# Patient Record
Sex: FEMALE | Race: WHITE | URBAN - METROPOLITAN AREA
[De-identification: names, ages, dates, MRNs, and addresses within clinical notes are randomized per-mention and may not be internally consistent; named-entity substitution may affect disease eponyms.]

---

## 2022-04-19 ENCOUNTER — HOSPITAL ENCOUNTER (OUTPATIENT)
Age: 67
Setting detail: SPECIMEN
Discharge: HOME | End: 2022-04-19

## 2022-04-19 ENCOUNTER — NURSE ONLY (OUTPATIENT)
Dept: FAMILY MEDICINE CLINIC | Age: 67
End: 2022-04-19

## 2022-04-20 LAB
SARS-COV-2: NORMAL
SARS-COV-2: NOT DETECTED
SOURCE: NORMAL

## 2022-12-14 ENCOUNTER — HOSPITAL ENCOUNTER (OUTPATIENT)
Dept: GENERAL RADIOLOGY | Age: 67
Discharge: HOME | End: 2022-12-16
Payer: MEDICARE

## 2022-12-14 ENCOUNTER — HOSPITAL ENCOUNTER (OUTPATIENT)
Age: 67
Discharge: HOME | End: 2022-12-16
Payer: MEDICARE

## 2022-12-14 DIAGNOSIS — M79.651 RIGHT THIGH PAIN: ICD-10-CM

## 2022-12-14 PROCEDURE — 73502 X-RAY EXAM HIP UNI 2-3 VIEWS: CPT

## 2023-01-09 ENCOUNTER — TELEPHONE (OUTPATIENT)
Dept: ORTHOPEDIC SURGERY | Age: 68
End: 2023-01-09

## 2023-01-10 NOTE — TELEPHONE ENCOUNTER
Called pt and she stated that she had a femur fx when she was 6 and bone was 'put together the wrong way'. She is now having right hip pain and could be stemming from this issue. She is not having a lot of pain but was advised by PCP to be evaluated based off xrs that were done on 12/15/22.  Scheduled to see Abigail Darlin on 1/12/23

## 2023-01-12 ENCOUNTER — OFFICE VISIT (OUTPATIENT)
Dept: ORTHOPEDIC SURGERY | Age: 68
End: 2023-01-12
Payer: MEDICARE

## 2023-01-12 VITALS — HEIGHT: 63 IN | BODY MASS INDEX: 21.97 KG/M2 | WEIGHT: 124 LBS | RESPIRATION RATE: 14 BRPM

## 2023-01-12 DIAGNOSIS — M21.851: ICD-10-CM

## 2023-01-12 DIAGNOSIS — S72.351S CLOSED DISPLACED COMMINUTED FRACTURE OF SHAFT OF RIGHT FEMUR, SEQUELA: Primary | ICD-10-CM

## 2023-01-12 PROCEDURE — G8427 DOCREV CUR MEDS BY ELIG CLIN: HCPCS | Performed by: PHYSICIAN ASSISTANT

## 2023-01-12 PROCEDURE — 4004F PT TOBACCO SCREEN RCVD TLK: CPT | Performed by: PHYSICIAN ASSISTANT

## 2023-01-12 PROCEDURE — G8484 FLU IMMUNIZE NO ADMIN: HCPCS | Performed by: PHYSICIAN ASSISTANT

## 2023-01-12 PROCEDURE — 99204 OFFICE O/P NEW MOD 45 MIN: CPT | Performed by: PHYSICIAN ASSISTANT

## 2023-01-12 PROCEDURE — 1123F ACP DISCUSS/DSCN MKR DOCD: CPT | Performed by: PHYSICIAN ASSISTANT

## 2023-01-12 PROCEDURE — G8400 PT W/DXA NO RESULTS DOC: HCPCS | Performed by: PHYSICIAN ASSISTANT

## 2023-01-12 PROCEDURE — 3017F COLORECTAL CA SCREEN DOC REV: CPT | Performed by: PHYSICIAN ASSISTANT

## 2023-01-12 PROCEDURE — 1090F PRES/ABSN URINE INCON ASSESS: CPT | Performed by: PHYSICIAN ASSISTANT

## 2023-01-12 PROCEDURE — G8420 CALC BMI NORM PARAMETERS: HCPCS | Performed by: PHYSICIAN ASSISTANT

## 2023-01-12 NOTE — PATIENT INSTRUCTIONS
Patient Education        Trochanteric Bursitis: Exercises  Introduction  Here are some examples of exercises for you to try. The exercises may be suggested for a condition or for rehabilitation. Start each exercise slowly. Ease off the exercises if you start to have pain.  You will be told when to start these exercises and which ones will work best for you.  How to do the exercises  Hamstring wall stretch  Lie on your back in a doorway, with your good leg through the open door.  Slide your affected leg up the wall to straighten your knee. You should feel a gentle stretch down the back of your leg.  Hold the stretch for at least 1 minute to begin. Then try to lengthen the time you hold the stretch to as long as 6 minutes.  Repeat 2 to 4 times.  If you do not have a place to do this exercise in a doorway, there is another way to do it:  Lie on your back, and bend the knee of your affected leg.  Loop a towel under the ball and toes of that foot, and hold the ends of the towel in your hands.  Straighten your knee, and slowly pull back on the towel. You should feel a gentle stretch down the back of your leg.  Hold the stretch for 15 to 30 seconds. Or even better, hold the stretch for 1 minute if you can.  Repeat 2 to 4 times.  Do not arch your back.  Do not bend either knee.  Keep one heel touching the floor and the other heel touching the wall. Do not point your toes.  Straight-leg raises to the outside  Lie on your side, with your affected leg on top.  Tighten the front thigh muscles of your top leg to keep your knee straight.  Keep your hip and your leg straight in line with the rest of your body, and keep your knee pointing forward. Do not drop your hip back.  Lift your top leg straight up toward the ceiling, about 12 inches off the floor. Hold for about 6 seconds, then slowly lower your leg.  Repeat 8 to 12 times.  Clamshell  Lie on your side, with your affected leg on top and your head propped on a pillow. Keep  your feet and knees together and your knees bent. Raise your top knee, but keep your feet together. Do not let your hips roll back. Your legs should open up like a clamshell. Hold for 6 seconds. Slowly lower your knee back down. Rest for 10 seconds. Repeat 8 to 12 times. Standing quadriceps stretch  If you are not steady on your feet, hold on to a chair, counter, or wall. You can also lie on your stomach or your side to do this exercise. Bend the knee of the leg you want to stretch, and reach behind you to grab the front of your foot or ankle with the hand on the same side. For example, if you are stretching your right leg, use your right hand. Keeping your knees next to each other, pull your foot toward your buttock until you feel a gentle stretch across the front of your hip and down the front of your thigh. Your knee should be pointed directly to the ground, and not out to the side. Hold the stretch for 15 to 30 seconds. Repeat 2 to 4 times. Piriformis stretch  Lie on your back with your legs straight. Lift your affected leg and bend your knee. With your opposite hand, reach across your body, and then gently pull your knee toward your opposite shoulder. Hold the stretch for 15 to 30 seconds. Repeat 2 to 4 times. Double knee-to-chest  Lie on your back with your knees bent and your feet flat on the floor. You can put a small pillow under your head and neck if it is more comfortable. Bring both knees to your chest.  Keep your lower back pressed to the floor. Hold for 15 to 30 seconds. Relax, and lower your knees to the starting position. Repeat 2 to 4 times. Follow-up care is a key part of your treatment and safety. Be sure to make and go to all appointments, and call your doctor if you are having problems. It's also a good idea to know your test results and keep a list of the medicines you take.   Current as of: March 9, 2022               Content Version: 13.5  © 7520-7418 [employer]. Care instructions adapted under license by [facility] ([facility]). If you have questions about a medical condition or this instruction, always ask your healthcare professional. [address] any warranty or liability for your use of this information.

## 2023-01-12 NOTE — PROGRESS NOTES
[address] [facility], [address] [place] [address] Road [address] [place] [address], [address] [place] [address], [address] [facility]           Dept Phone: [phone]           Dept Fax:  [phone] [address] [facility]           Lyric Vaughn          Dept Phone: [phone]           Dept Fax:  [phone]      Chief Compliant:  Chief Complaint   Patient presents with    Hip Pain     Right hip, NP        History of Present Illness: This is a 69 y.o. female who presents to the clinic today for evaluation of had concerns including Hip Pain (Right hip, NP). Ms. Ivis Mata is a 66-year-old female who presents for evaluation of intermittent right hip and right leg pain. Patient with history of fairly severe femoral shaft fracture which occurred 61 years ago. She reports that the femur was \"broken into pieces\". She states this was treated nonoperatively with extensive casting at that time. She states she has had a leg length discrepancy with the right being shorter than left since becoming an adult. She states she has modified activity accordingly but has not had any significant pain until the past several months when she has started to have intermittent pain of the right lateral hip which occasionally radiates down the leg. She denies any back pain numbness or tingling     Patient was evaluated by PCP who ordered x-rays Of the hip in December which demonstrated an angular deformity likely indicative of remote trauma without evidence of acute fracture or significant degenerative changes. Past History:  No current outpatient medications on file.   No Known Allergies  Social History     Socioeconomic History    Marital status:      Spouse name: Not on file    Number of children: Not on file    Years of education: Not on file    Highest education level: Not on file   Occupational History    Not on file   Tobacco Use Smoking status: Not on file    Smokeless tobacco: Not on file   Substance and Sexual Activity    Alcohol use: Not on file    Drug use: Not on file    Sexual activity: Not on file   Other Topics Concern    Not on file   Social History Narrative    Not on file     Social Determinants of Health     Financial Resource Strain: Not on file   Food Insecurity: Not on file   Transportation Needs: Not on file   Physical Activity: Not on file   Stress: Not on file   Social Connections: Not on file   Intimate Partner Violence: Not on file   Housing Stability: Not on file     No past medical history on file. No past surgical history on file. No family history on file. Review of Systems   Constitutional: Negative for fever, chills, sweats. Eyes: Negative for changes in vision, or pain. HENT: Negative for ear ache, epistaxis, or sore throat. Respiratory/Cardio: Negative for Chest pain, palpitations, SOB, or cough. Gastrointestinal: Negative for abdominal pain, N/V/D. Genitourinary: Negative for dysuria, frequency, urgency, or hematuria. Neurological: Negative for headache, numbness, or weakness. Integumentary: Negative for rash, itching, laceration, or abrasion. Musculoskeletal: Positive for Hip Pain (Right hip, NP)       Physical Exam:  Constitutional: Patient is oriented to person, place, and time. Patient appears well-developed and well nourished. HENT: Negative otherwise noted  Head: Normocephalic and Atraumatic  Nose: Normal  Eyes: Conjunctivae and EOM are normal  Neck: Normal range of motion Neck supple. Respiratory/Cardio: Effort normal. No respiratory distress. Musculoskeletal:    right Hip    Tenderness: Severe tenderness over the right greater trochanter and mid lateral thigh/IT band. Mild tenderness over the proximal IT band.   No tenderness to the anterior posterior hip       Range of Motion:      Extension: 10     Flexion: 120     Internal Rotation: 30     External Rotation: 40     Abduction: 40     Adduction:  30       Muscle Strength      Abduction: 5/5     Adduction: 5/5     Flexion: 5/5         Gait: Antalgic   Robert Test: Negative   Brannon Test: Positive       Neurological: Patient is alert and oriented to person, place, and time. Normal strenght. No sensory deficit. Skin: Skin is warm and dry  Psychiatric: Behavior is normal. Thought content normal.  Nursing note and vitals reviewed. Labs and Imaging:     XR HIP RIGHT (2-3 VIEWS)  Narrative: EXAMINATION:  3 XRAY VIEWS OF THE RIGHT HIP    12/14/2022 4:03 pm    COMPARISON:  None. HISTORY:  ORDERING SYSTEM PROVIDED HISTORY: Right thigh pain  TECHNOLOGIST PROVIDED HISTORY:  Reason for Exam: Right thigh pain  Additional signs and symptoms: Pt c/o right anterior mid thigh pain on and  off for several years that has become worse and more constant lately. Relevant Medical/Surgical History: Pt states she broke her femur 60 years ago  and it did not heal properly. FINDINGS:  Angulated deformity of the proximal femoral diaphysis may be congenital  possibly from old trauma. Otherwise, no fracture or dislocation. No  destructive bony abnormality identified. Impression: Angulated deformity proximal femoral diaphysis possibly congenital or from  old trauma    No acute abnormality identified    X-rays taken in clinic today and preliminarily reviewed by me 1/12/23:  4 views of the right femur demonstrate mild degenerative changes of the right knee especially in the medial compartment. Hip joint overall is well-maintained without significant degenerative changes. There is an angulated deformity of the proximal femoral diaphysis consistent with remote trauma reported by patient. No evidence of acute fracture       Orders Placed This Encounter   Procedures    XR FEMUR RIGHT (MIN 2 VIEWS)     Standing Status:   Future     Number of Occurrences:   1     Standing Expiration Date:   1/12/2024       Assessment and Plan:  1.  Closed displaced comminuted fracture of shaft of right femur, sequela          PLAN:  Dom Mcdonald is a 69 y.o. old female who presents for evaluation of intermittent right hip pain. Patient reports significant trauma to the right leg which resulted in a displaced proximal femoral shaft fracture. This was treated nonoperatively but unfortunate patient has been dealing with a leg length discrepancy since adulthood. She states she has tolerated this very well and has had minimal issues with her legs but has started to have recent right hip and right leg pain. X-rays today demonstrate no evidence of acute fracture but do demonstrate a angulated deformity likely from remote injury. Examination of patient's hip demonstrates excellent range of motion she does have some pain consistent with trochanteric bursitis. We discussed treatment options available to her including home exercise program, referral to physical therapy and a corticosteroid injection. At this time patient reports pain is not bad enough for further intervention and would like to try a home exercise program before pursuing further intervention. We will see patient back on a as needed basis      Electronically signed by ALBANIA Chand on 1/12/23 at 2:45 PM EST        Please note that this chart was generated using voice recognition Dragon dictation software. Although every effort was made to ensure the accuracy of this automated transcription, some errors in transcription may have occurred.

## 2023-02-01 ENCOUNTER — APPOINTMENT (OUTPATIENT)
Dept: CT IMAGING | Age: 68
End: 2023-02-01
Payer: MEDICARE

## 2023-02-01 ENCOUNTER — APPOINTMENT (OUTPATIENT)
Dept: GENERAL RADIOLOGY | Age: 68
End: 2023-02-01
Payer: MEDICARE

## 2023-02-01 ENCOUNTER — HOSPITAL ENCOUNTER (EMERGENCY)
Age: 68
Discharge: HOME | End: 2023-02-01
Attending: EMERGENCY MEDICINE
Payer: MEDICARE

## 2023-02-01 VITALS
RESPIRATION RATE: 17 BRPM | DIASTOLIC BLOOD PRESSURE: 65 MMHG | WEIGHT: 123 LBS | TEMPERATURE: 98 F | BODY MASS INDEX: 22.63 KG/M2 | HEART RATE: 82 BPM | OXYGEN SATURATION: 96 % | HEIGHT: 62 IN | SYSTOLIC BLOOD PRESSURE: 144 MMHG

## 2023-02-01 DIAGNOSIS — R68.89 NECK COMPLAINT: Primary | ICD-10-CM

## 2023-02-01 LAB
ABSOLUTE EOS #: 0.1 K/UL (ref 0–0.4)
ABSOLUTE LYMPH #: 2 K/UL (ref 1–4.8)
ABSOLUTE MONO #: 0.6 K/UL (ref 0.1–1.3)
ALBUMIN SERPL-MCNC: 4.8 G/DL (ref 3.5–5.2)
ALP SERPL-CCNC: 45 U/L (ref 35–104)
ALT SERPL-CCNC: 13 U/L (ref 5–33)
ANION GAP SERPL CALCULATED.3IONS-SCNC: 13 MMOL/L (ref 9–17)
AST SERPL-CCNC: 18 U/L
BASOPHILS # BLD: 1 % (ref 0–2)
BASOPHILS ABSOLUTE: 0.1 K/UL (ref 0–0.2)
BILIRUB SERPL-MCNC: 0.3 MG/DL (ref 0.3–1.2)
BUN SERPL-MCNC: 11 MG/DL (ref 8–23)
CALCIUM SERPL-MCNC: 9.7 MG/DL (ref 8.6–10.4)
CHLORIDE SERPL-SCNC: 101 MMOL/L (ref 98–107)
CO2 SERPL-SCNC: 26 MMOL/L (ref 20–31)
CREAT SERPL-MCNC: 0.48 MG/DL (ref 0.5–0.9)
EOSINOPHILS RELATIVE PERCENT: 2 % (ref 0–4)
GFR SERPL CREATININE-BSD FRML MDRD: >60 ML/MIN/1.73M2
GLUCOSE SERPL-MCNC: 102 MG/DL (ref 70–99)
HCT VFR BLD AUTO: 39.8 % (ref 36–46)
HGB BLD-MCNC: 12.9 G/DL (ref 12–16)
INR PPP: 0.9
LYMPHOCYTES # BLD: 34 % (ref 24–44)
MAGNESIUM SERPL-MCNC: 2.2 MG/DL (ref 1.6–2.6)
MCH RBC QN AUTO: 30 PG (ref 26–34)
MCHC RBC AUTO-ENTMCNC: 32.6 G/DL (ref 31–37)
MCV RBC AUTO: 92 FL (ref 80–100)
MONOCYTES # BLD: 11 % (ref 1–7)
PDW BLD-RTO: 14.6 % (ref 11.5–14.9)
PLATELET # BLD AUTO: 249 K/UL (ref 150–450)
PMV BLD AUTO: 7.9 FL (ref 6–12)
POTASSIUM SERPL-SCNC: 3.6 MMOL/L (ref 3.7–5.3)
PROT SERPL-MCNC: 7.9 G/DL (ref 6.4–8.3)
PROTHROMBIN TIME: 12.3 SEC (ref 11.8–14.6)
RBC # BLD: 4.32 M/UL (ref 4–5.2)
SEG NEUTROPHILS: 52 % (ref 36–66)
SEGMENTED NEUTROPHILS ABSOLUTE COUNT: 3 K/UL (ref 1.3–9.1)
SODIUM SERPL-SCNC: 140 MMOL/L (ref 135–144)
T4 FREE SERPL-MCNC: 1.29 NG/DL (ref 0.93–1.7)
TROPONIN I SERPL DL<=0.01 NG/ML-MCNC: 7 NG/L (ref 0–14)
TSH SERPL-ACNC: 1.03 UIU/ML (ref 0.3–5)
WBC # BLD AUTO: 5.7 K/UL (ref 3.5–11)

## 2023-02-01 PROCEDURE — 2580000003 HC RX 258: Performed by: EMERGENCY MEDICINE

## 2023-02-01 PROCEDURE — 36415 COLL VENOUS BLD VENIPUNCTURE: CPT

## 2023-02-01 PROCEDURE — 6360000004 HC RX CONTRAST MEDICATION: Performed by: EMERGENCY MEDICINE

## 2023-02-01 PROCEDURE — 71045 X-RAY EXAM CHEST 1 VIEW: CPT

## 2023-02-01 PROCEDURE — 93005 ELECTROCARDIOGRAM TRACING: CPT | Performed by: EMERGENCY MEDICINE

## 2023-02-01 PROCEDURE — 80053 COMPREHEN METABOLIC PANEL: CPT

## 2023-02-01 PROCEDURE — 99285 EMERGENCY DEPT VISIT HI MDM: CPT

## 2023-02-01 PROCEDURE — 85610 PROTHROMBIN TIME: CPT

## 2023-02-01 PROCEDURE — 85025 COMPLETE CBC W/AUTO DIFF WBC: CPT

## 2023-02-01 PROCEDURE — 83735 ASSAY OF MAGNESIUM: CPT

## 2023-02-01 PROCEDURE — 84443 ASSAY THYROID STIM HORMONE: CPT

## 2023-02-01 PROCEDURE — 70491 CT SOFT TISSUE NECK W/DYE: CPT

## 2023-02-01 PROCEDURE — 84439 ASSAY OF FREE THYROXINE: CPT

## 2023-02-01 PROCEDURE — 84484 ASSAY OF TROPONIN QUANT: CPT

## 2023-02-01 RX ORDER — 0.9 % SODIUM CHLORIDE 0.9 %
100 INTRAVENOUS SOLUTION INTRAVENOUS ONCE
Status: COMPLETED | OUTPATIENT
Start: 2023-02-01 | End: 2023-02-01

## 2023-02-01 RX ORDER — SODIUM CHLORIDE 0.9 % (FLUSH) 0.9 %
10 SYRINGE (ML) INJECTION PRN
Status: DISCONTINUED | OUTPATIENT
Start: 2023-02-01 | End: 2023-02-01 | Stop reason: HOSPADM

## 2023-02-01 RX ORDER — TRAZODONE HYDROCHLORIDE 100 MG/1
100 TABLET ORAL NIGHTLY
COMMUNITY

## 2023-02-01 RX ADMIN — IOPAMIDOL 75 ML: 755 INJECTION, SOLUTION INTRAVENOUS at 16:17

## 2023-02-01 RX ADMIN — SODIUM CHLORIDE 100 ML: 9 INJECTION, SOLUTION INTRAVENOUS at 16:18

## 2023-02-01 RX ADMIN — SODIUM CHLORIDE, PRESERVATIVE FREE 10 ML: 5 INJECTION INTRAVENOUS at 16:18

## 2023-02-01 ASSESSMENT — ENCOUNTER SYMPTOMS
EYE PAIN: 0
BACK PAIN: 0
COLOR CHANGE: 0
ABDOMINAL PAIN: 0
SHORTNESS OF BREATH: 1

## 2023-02-01 NOTE — ED PROVIDER NOTES
EMERGENCY DEPARTMENT ENCOUNTER    Pt Name: Silver Spicer  MRN: [id number]  [place] 1955  Date of evaluation: 2/1/23  CHIEF COMPLAINT       Chief Complaint   Patient presents with    Lymphadenopathy    Shortness of Breath     HISTORY OF PRESENT ILLNESS   58-year-old female presents for reports of right-sided neck swelling. She states that she had a veneer break off approximately 6 to 7 months ago she states that she had followed up with her dentist multiple times over the summer and ultimately recently had the tooth pulled. She reports that she still has concerned that there is an infection going on in the tooth. She reports that she has been on multiple courses of antibiotic and she does not think that it is getting better. She states that she currently not having any pain but she reports that she feels like she is having a difficult time breathing she states that her neck feels \"tight and she feels like it is swollen. She denies any difficulty swallowing denies any associated fevers or chills. She denies any chest pain abdominal pain nausea or vomiting. Denies any significant past medical history. The history is provided by the patient. REVIEW OF SYSTEMS     Review of Systems   Constitutional:  Negative for fever. HENT:  Negative for congestion and ear pain. Eyes:  Negative for pain. Respiratory:  Positive for shortness of breath. Cardiovascular:  Negative for chest pain, palpitations and leg swelling. Gastrointestinal:  Negative for abdominal pain. Genitourinary:  Negative for dysuria and flank pain. Musculoskeletal:  Negative for back pain. Skin:  Negative for color change. Neurological:  Negative for numbness and headaches. Psychiatric/Behavioral:  Negative for confusion. All other systems reviewed and are negative. PASTMEDICAL HISTORY   History reviewed. No pertinent past medical history.   Past Problem List  There is no problem list on file for this patient.    SURGICAL HISTORY     History reviewed. No pertinent surgical history.  CURRENT MEDICATIONS       Discharge Medication List as of 2/1/2023  5:13 PM        CONTINUE these medications which have NOT CHANGED    Details   traZODone (DESYREL) 100 MG tablet Take 100 mg by mouth nightlyHistorical Med           ALLERGIES     has No Known Allergies.  FAMILY HISTORY     has no family status information on file.      SOCIAL HISTORY       Social History     Substance Use Topics    Alcohol use: Yes     Comment: social     PHYSICAL EXAM     INITIAL VITALS: BP (!) 144/65   Pulse 82   Temp 98 °F (36.7 °C) (Oral)   Resp 17   Ht 5' 2\" (1.575 m)   Wt 123 lb (55.8 kg)   SpO2 96%   BMI 22.50 kg/m²    Physical Exam  Vitals and nursing note reviewed.   Constitutional:       General: She is not in acute distress.     Appearance: Normal appearance. She is not ill-appearing or toxic-appearing.   HENT:      Head: Normocephalic and atraumatic.      Jaw: There is normal jaw occlusion. No trismus, tenderness, swelling, pain on movement or malocclusion.      Nose: Nose normal.      Mouth/Throat:      Mouth: Mucous membranes are moist.      Dentition: No dental tenderness, gingival swelling, dental caries, dental abscesses or gum lesions.      Pharynx: Oropharynx is clear. Uvula midline. No pharyngeal swelling or posterior oropharyngeal erythema.      Tonsils: No tonsillar exudate or tonsillar abscesses.        Comments: Floor the mouth is soft, no tongue elevation, no significant swelling or erythema noted in the neck or chin  Eyes:      Extraocular Movements: Extraocular movements intact.      Conjunctiva/sclera: Conjunctivae normal.      Pupils: Pupils are equal, round, and reactive to light.   Cardiovascular:      Rate and Rhythm: Normal rate and regular rhythm.      Pulses: Normal pulses.      Heart sounds: Normal heart sounds.   Pulmonary:      Effort: Pulmonary effort is normal.      Breath sounds: Normal breath sounds. No  stridor. Abdominal:      General: Bowel sounds are normal. There is no distension. Palpations: Abdomen is soft. Tenderness: There is no abdominal tenderness. Musculoskeletal:         General: Normal range of motion. Cervical back: Normal range of motion. No spinous process tenderness or muscular tenderness. Lymphadenopathy:      Head:      Right side of head: No submandibular adenopathy. Left side of head: No submandibular adenopathy. Cervical: No cervical adenopathy. Skin:     General: Skin is warm and dry. Capillary Refill: Capillary refill takes less than 2 seconds. Neurological:      General: No focal deficit present. Mental Status: She is alert and oriented to person, place, and time. Cranial Nerves: Cranial nerves 2-12 are intact. Sensory: Sensation is intact. Motor: Motor function is intact. Psychiatric:         Mood and Affect: Mood normal.         Thought Content: Thought content does not include homicidal or suicidal ideation. MEDICAL DECISION MAKING / ED COURSE:   68-year-old female presents for concern for dental infection.   On initial exam patient in no acute distress vitals are stable she has a old site in the right mandible area where tooth was extracted there is no obvious signs of infection at this time, no abscess seen, no evidence of Ludwigs angina      1)  Number and Complexity of Problems Addressed at this Encounter  Problem List This Visit: Concern for dental infection    Differential Diagnosis: Apical abscess, lymphadenopathy, malignancy, deep space abscess    Diagnoses Considered but Do Not Suspect:  Ludwigs angina          3)  Treatment and Disposition         We will check basic labs and imaging to evaluate for any signs of lymphadenopathy or abscess    Labs were reviewed and unremarkable, chest x-ray was negative for acute process, CT was reviewed showing no acute abnormality of the soft tissue structures and neck no signs of masses or abnormal collections no significantly enlarged lymph nodes, no evidence of abscess or infection around the teeth    Given that patient has no evidence of abscess or lymphadenopathy on CT feel that she is stable for discharge home at this time she is in secretions, airway is intact, will provide information for ENT follow-up as well also suggested that patient follow-up again with her dentist, discussed return precautions    Patient repeat assessment: Today new complaints    Disposition discussion with patient/family, Shared Decision Making: Results were discussed with patient and  discussed need for follow-up with dentist, recommend follow-up with ENT as well and discussed return precautions, patient voiced understanding and is comfortable with plan and discharge    Patient/Guardian was informed of their diagnosis and told to follow up with PCP & dentist/ENT in 1-3 days. Patient demonstrates understanding and agreement with the plan. They were given the opportunity to ask questions and those questions were answered to the best of our ability with the available information. Patient/Guardian told to return to the ED for any new, worsening, changing or persistent symptoms. CRITICAL CARE:       PROCEDURES:    Procedures      DATA FOR LAB AND RADIOLOGY TESTS ORDERED BELOW ARE REVIEWED BY THE ED CLINICIAN:    RADIOLOGY: All x-rays, CT, MRI, and formal ultrasound images (except ED bedside ultrasound) are read by the radiologist, see reports below, unless otherwise noted in MDM or here. Reports below are reviewed by myself. CT SOFT TISSUE NECK W CONTRAST   Final Result   No acute abnormality of the soft tissue structures of the neck. No mass   lesion or abnormal collection. No pathologically enlarged neck   lymphadenopathy. No discrete inflammatory changes noted surrounding the mandibular or   maxillary teeth. No evidence of cavitary change or periapical abscess of the   teeth is noted. There are  right maxillary premolar and left mandibular   molar dental implants. XR CHEST PORTABLE   Final Result   No acute findings. Pulmonary hyperinflation and interstitial prominence. Correlate clinically   for COPD. LABS: Lab orders shown below, the results are reviewed by myself, and all abnormals are listed below. Labs Reviewed   CBC WITH AUTO DIFFERENTIAL - Abnormal; Notable for the following components:       Result Value    Monocytes 11 (*)     All other components within normal limits   COMPREHENSIVE METABOLIC PANEL - Abnormal; Notable for the following components:    Glucose 102 (*)     Creatinine 0.48 (*)     Potassium 3.6 (*)     All other components within normal limits   MAGNESIUM   T4, FREE   PROTIME-INR   TROPONIN   TSH       Vitals Reviewed:    Vitals:    02/01/23 1429 02/01/23 1430   BP:  (!) 144/65   Pulse: 82    Resp:  17   Temp:  98 °F (36.7 °C)   TempSrc:  Oral   SpO2: 96%    Weight: 123 lb (55.8 kg)    Height: 5' 2\" (1.575 m)      MEDICATIONS GIVEN TO PATIENT THIS ENCOUNTER:  Orders Placed This Encounter   Medications    iopamidol (ISOVUE-370) 76 % injection 75 mL    0.9 % sodium chloride bolus    DISCONTD: sodium chloride flush 0.9 % injection 10 mL     DISCHARGE PRESCRIPTIONS:  Discharge Medication List as of 2/1/2023  5:13 PM        PHYSICIAN CONSULTS ORDERED THIS ENCOUNTER:  None  FINAL IMPRESSION      1.  Neck complaint          DISPOSITION/PLAN   DISPOSITION Decision To Discharge 02/01/2023 05:09:23 PM      OUTPATIENT FOLLOW UP THE PATIENT:  Naida Cota  [address]  [phone]    Schedule an appointment as soon as possible for a visit       [facility] ED  [address]  [address] [phone]    As needed, If symptoms worsen    Your Dentist    Schedule an appointment as soon as possible for a visit       Anabel Odonnell MD  [address] [place] [address]  [phone]    Schedule an appointment as soon as possible for a visit       Raliegh Brunner, DO Raliegh Brunner, DO  02/02/23 1 Chase Dowling, DO  02/02/23 1046

## 2023-02-02 LAB
EKG ATRIAL RATE: 68 BPM
EKG P AXIS: 16 DEGREES
EKG P-R INTERVAL: 104 MS
EKG Q-T INTERVAL: 420 MS
EKG QRS DURATION: 102 MS
EKG QTC CALCULATION (BAZETT): 446 MS
EKG R AXIS: 47 DEGREES
EKG T AXIS: 40 DEGREES
EKG VENTRICULAR RATE: 68 BPM

## 2023-02-02 PROCEDURE — 93010 ELECTROCARDIOGRAM REPORT: CPT | Performed by: INTERNAL MEDICINE

## 2023-09-14 ENCOUNTER — HOSPITAL ENCOUNTER (OUTPATIENT)
Age: 68
Discharge: HOME | End: 2023-09-14
Payer: MEDICARE

## 2023-09-14 PROCEDURE — 84520 ASSAY OF UREA NITROGEN: CPT

## 2023-09-14 PROCEDURE — 85025 COMPLETE CBC W/AUTO DIFF WBC: CPT

## 2023-09-14 PROCEDURE — 80076 HEPATIC FUNCTION PANEL: CPT

## 2023-09-14 PROCEDURE — 82565 ASSAY OF CREATININE: CPT

## 2023-09-14 PROCEDURE — 86038 ANTINUCLEAR ANTIBODIES: CPT

## 2023-09-14 PROCEDURE — 86235 NUCLEAR ANTIGEN ANTIBODY: CPT

## 2023-09-14 PROCEDURE — 86225 DNA ANTIBODY NATIVE: CPT

## 2023-09-14 PROCEDURE — 36415 COLL VENOUS BLD VENIPUNCTURE: CPT

## 2023-09-14 PROCEDURE — 86160 COMPLEMENT ANTIGEN: CPT

## 2023-09-15 LAB
ALBUMIN SERPL-MCNC: 4.7 G/DL (ref 3.5–5.2)
ALBUMIN/GLOB SERPL: 1.7 {RATIO} (ref 1–2.5)
ALP SERPL-CCNC: 53 U/L (ref 35–104)
ALT SERPL-CCNC: 17 U/L (ref 5–33)
AST SERPL-CCNC: 24 U/L
BASOPHILS # BLD: 0.04 K/UL (ref 0–0.2)
BASOPHILS NFR BLD: 1 % (ref 0–2)
BILIRUB DIRECT SERPL-MCNC: 0.1 MG/DL
BILIRUB INDIRECT SERPL-MCNC: 0.1 MG/DL (ref 0–1)
BILIRUB SERPL-MCNC: 0.2 MG/DL (ref 0.3–1.2)
BUN SERPL-MCNC: 5 MG/DL (ref 8–23)
CREAT SERPL-MCNC: 0.5 MG/DL (ref 0.5–0.9)
EOSINOPHIL # BLD: 0.1 K/UL (ref 0–0.44)
EOSINOPHILS RELATIVE PERCENT: 2 % (ref 1–4)
ERYTHROCYTE [DISTWIDTH] IN BLOOD BY AUTOMATED COUNT: 14.3 % (ref 11.8–14.4)
GFR SERPL CREATININE-BSD FRML MDRD: >60 ML/MIN/1.73M2
HCT VFR BLD AUTO: 41.8 % (ref 36.3–47.1)
HGB BLD-MCNC: 13.3 G/DL (ref 11.9–15.1)
IMM GRANULOCYTES # BLD AUTO: <0.03 K/UL (ref 0–0.3)
IMM GRANULOCYTES NFR BLD: 0 %
LYMPHOCYTES NFR BLD: 1.87 K/UL (ref 1.1–3.7)
LYMPHOCYTES RELATIVE PERCENT: 39 % (ref 24–43)
MCH RBC QN AUTO: 30.2 PG (ref 25.2–33.5)
MCHC RBC AUTO-ENTMCNC: 31.8 G/DL (ref 28.4–34.8)
MCV RBC AUTO: 95 FL (ref 82.6–102.9)
MONOCYTES NFR BLD: 0.6 K/UL (ref 0.1–1.2)
MONOCYTES NFR BLD: 13 % (ref 3–12)
NEUTROPHILS NFR BLD: 45 % (ref 36–65)
NEUTS SEG NFR BLD: 2.16 K/UL (ref 1.5–8.1)
NRBC BLD-RTO: 0 PER 100 WBC
PLATELET # BLD AUTO: 247 K/UL (ref 138–453)
PMV BLD AUTO: 10.9 FL (ref 8.1–13.5)
PROT SERPL-MCNC: 7.4 G/DL (ref 6.4–8.3)
RBC # BLD AUTO: 4.4 M/UL (ref 3.95–5.11)
WBC OTHER # BLD: 4.8 K/UL (ref 3.5–11.3)

## 2023-09-16 LAB
C3 SERPL-MCNC: 86 MG/DL (ref 90–180)
C4 SERPL-MCNC: 22 MG/DL (ref 10–40)

## 2023-09-19 LAB
ANA SER QL IA: NEGATIVE
DSDNA IGG SER QL IA: 0.8 IU/ML
NUCLEAR IGG SER IA-RTO: 0.3 U/ML

## 2023-09-22 LAB
ENA SCL70 AB SER IA-ACNC: NORMAL U/ML
ENA SM AB SER-ACNC: <0.8 U/ML
ENA SS-A IGG SER QL: <0.3 U/ML
ENA SS-B IGG SER IA-ACNC: 3 U/ML
U1 SNRNP IGG SER IA-ACNC: <0.3 U/ML

## 2023-12-18 ENCOUNTER — HOSPITAL ENCOUNTER (EMERGENCY)
Age: 68
Discharge: HOME | End: 2023-12-18
Attending: EMERGENCY MEDICINE
Payer: MEDICARE

## 2023-12-18 VITALS
HEART RATE: 84 BPM | OXYGEN SATURATION: 97 % | BODY MASS INDEX: 23 KG/M2 | TEMPERATURE: 97.8 F | HEIGHT: 62 IN | DIASTOLIC BLOOD PRESSURE: 79 MMHG | SYSTOLIC BLOOD PRESSURE: 136 MMHG | WEIGHT: 125 LBS | RESPIRATION RATE: 18 BRPM

## 2023-12-18 DIAGNOSIS — K12.0 APHTHOUS ULCER: Primary | ICD-10-CM

## 2023-12-18 PROCEDURE — 99283 EMERGENCY DEPT VISIT LOW MDM: CPT

## 2023-12-18 RX ORDER — TRIAMCINOLONE ACETONIDE 0.1 %
PASTE (GRAM) DENTAL
Qty: 1 EACH | Refills: 0 | Status: SHIPPED | OUTPATIENT
Start: 2023-12-18

## 2023-12-18 NOTE — ED PROVIDER NOTES
[address] [facility]  eMERGENCY dEPARTMENT eNCOUnter   Independent Attestation     Pt Name: Wendie Marr  MRN: [id number]  [address] [facility] 1955  Date of evaluation: 12/18/23       Wendie Marr is a 77 y.o. female who presents with Mass (Growth on tongue notice yesterday)        Based on the medical record, the care appears appropriate. I was personally available for consultation in the Emergency Department.     Kit Stephens MD  Attending Emergency  Physician                Kit Stephens MD  12/18/23 [id number]

## 2023-12-18 NOTE — DISCHARGE INSTRUCTIONS
Apply dental paste to affected area twice daily as needed until ulcer is resolved    If ulcer area does not heal or enlarges you will need further evaluation and possibly a biopsy to the area.     Please follow-up with your family doctor this week for reevaluation    Take Tylenol and Motrin as directed as needed for discomfort    Rinse mouth with warm salt water several times a day    If symptoms worsen you develop any difficulty swallowing, tongue swelling, throat pain, throat swelling or any new concerning symptoms arise please return to the emergency department immediately

## 2023-12-25 NOTE — ED PROVIDER NOTES
[address] Name: Jhonny Hackett  MRN: [id number]  [address] [facility] [place] 1955  Date of evaluation: 12/18/2023  Provider: STACEY Chong CNP    CHIEF COMPLAINT       Chief Complaint   Patient presents with    Mass     Growth on tongue notice yesterday         HISTORY OF PRESENT ILLNESS   (Location/Symptom, Timing/Onset, Context/Setting, Quality, Duration, Modifying Factors, Severity)  Note limiting factors. Jhonny Hackett is a 77 y.o. female who presents to the emergency department this is a nontoxic-appearing 75-year-old female who presents emergency department with a sore on the left back of her tongue. She states she noticed the area yesterday, but reports today has increased in size become more painful. She denies biting her tongue. She does wear dental retainers to sleep. She denies any injury to the tongue that she is aware of. She states she does have history of Sjogren's disease so all of her mucous membranes are very dry. She denies any fevers or chills cough or cold symptoms. She denies any masses or lumps there prior to this episode. She denies any tongue swelling difficulty swallowing or lip swelling. She denies any chest pain, shortness of breath abdominal pain nausea vomiting or diarrhea. Nursing Notes were reviewed.     REVIEW OF SYSTEMS       Constitutional: No fevers or chills   HEENT: No sore throat, rhinorrhea, or earache + tongue sore  Eyes: No blurry vision or double vision no drainage   Cardiovascular: No chest pain or tachycardia   Respiratory: No wheezing or shortness of breath no cough   Gastrointestinal: No nausea, vomiting, diarrhea, constipation, or abdominal pain   : No hematuria or dysuria   Musculoskeletal: No swelling or pain   Skin: No rash   Neurological: No focal neurologic complaints, paresthesias, weakness, or headache      Except as noted above the remainder of the review of systems was reviewed

## 2025-03-25 ENCOUNTER — APPOINTMENT (OUTPATIENT)
Dept: CT IMAGING | Age: 70
End: 2025-03-25
Payer: MEDICARE

## 2025-03-25 ENCOUNTER — HOSPITAL ENCOUNTER (EMERGENCY)
Age: 70
Discharge: HOME | End: 2025-03-25
Attending: STUDENT IN AN ORGANIZED HEALTH CARE EDUCATION/TRAINING PROGRAM
Payer: MEDICARE

## 2025-03-25 VITALS
SYSTOLIC BLOOD PRESSURE: 140 MMHG | HEART RATE: 68 BPM | WEIGHT: 125 LBS | DIASTOLIC BLOOD PRESSURE: 85 MMHG | HEIGHT: 62 IN | RESPIRATION RATE: 16 BRPM | OXYGEN SATURATION: 97 % | TEMPERATURE: 97.9 F | BODY MASS INDEX: 23 KG/M2

## 2025-03-25 DIAGNOSIS — S32.10XA CLOSED FRACTURE OF SACRUM, UNSPECIFIED PORTION OF SACRUM, INITIAL ENCOUNTER (HCC): Primary | ICD-10-CM

## 2025-03-25 DIAGNOSIS — N20.0 KIDNEY STONE: ICD-10-CM

## 2025-03-25 LAB
ANION GAP SERPL CALCULATED.3IONS-SCNC: 11 MMOL/L (ref 9–17)
BACTERIA URNS QL MICRO: ABNORMAL
BASOPHILS # BLD: 0 K/UL (ref 0–0.2)
BASOPHILS NFR BLD: 1 % (ref 0–2)
BILIRUB UR QL STRIP: NEGATIVE
BUN SERPL-MCNC: 8 MG/DL (ref 8–23)
CALCIUM SERPL-MCNC: 9.7 MG/DL (ref 8.6–10.4)
CHARACTER UR: ABNORMAL
CHLORIDE SERPL-SCNC: 103 MMOL/L (ref 98–107)
CLARITY UR: CLEAR
CO2 SERPL-SCNC: 28 MMOL/L (ref 20–31)
COLOR UR: YELLOW
CREAT SERPL-MCNC: <0.4 MG/DL (ref 0.5–0.9)
EOSINOPHIL # BLD: 0.1 K/UL (ref 0–0.4)
EOSINOPHILS RELATIVE PERCENT: 2 % (ref 1–4)
EPI CELLS #/AREA URNS HPF: ABNORMAL /HPF (ref 0–5)
ERYTHROCYTE [DISTWIDTH] IN BLOOD BY AUTOMATED COUNT: 14.6 % (ref 12.5–15.4)
GFR, ESTIMATED: ABNORMAL ML/MIN/1.73M2
GLUCOSE SERPL-MCNC: 106 MG/DL (ref 70–99)
GLUCOSE UR STRIP-MCNC: NEGATIVE MG/DL
HCT VFR BLD AUTO: 39 % (ref 36–46)
HGB BLD-MCNC: 13.1 G/DL (ref 12–16)
HGB UR QL STRIP.AUTO: ABNORMAL
KETONES UR STRIP-MCNC: NEGATIVE MG/DL
LEUKOCYTE ESTERASE UR QL STRIP: ABNORMAL
LYMPHOCYTES NFR BLD: 1.7 K/UL (ref 1–4.8)
LYMPHOCYTES RELATIVE PERCENT: 36 % (ref 24–44)
MCH RBC QN AUTO: 30.6 PG (ref 26–34)
MCHC RBC AUTO-ENTMCNC: 33.5 G/DL (ref 31–37)
MCV RBC AUTO: 91.4 FL (ref 80–100)
MONOCYTES NFR BLD: 0.5 K/UL (ref 0.1–1.2)
MONOCYTES NFR BLD: 10 % (ref 2–11)
NEUTROPHILS NFR BLD: 51 % (ref 36–66)
NEUTS SEG NFR BLD: 2.4 K/UL (ref 1.8–7.7)
NITRITE UR QL STRIP: NEGATIVE
PH UR STRIP: 6 [PH] (ref 5–8)
PLATELET # BLD AUTO: 295 K/UL (ref 140–450)
PMV BLD AUTO: 7.5 FL (ref 6–12)
POTASSIUM SERPL-SCNC: 3.8 MMOL/L (ref 3.7–5.3)
PROT UR STRIP-MCNC: NEGATIVE MG/DL
RBC # BLD AUTO: 4.27 M/UL (ref 4–5.2)
RBC #/AREA URNS HPF: ABNORMAL /HPF (ref 0–2)
SODIUM SERPL-SCNC: 142 MMOL/L (ref 135–144)
SP GR UR STRIP: 1.02 (ref 1–1.03)
UROBILINOGEN UR STRIP-ACNC: NORMAL EU/DL (ref 0–1)
WBC #/AREA URNS HPF: ABNORMAL /HPF (ref 0–5)
WBC OTHER # BLD: 4.8 K/UL (ref 3.5–11)

## 2025-03-25 PROCEDURE — 72131 CT LUMBAR SPINE W/O DYE: CPT

## 2025-03-25 PROCEDURE — 85025 COMPLETE CBC W/AUTO DIFF WBC: CPT

## 2025-03-25 PROCEDURE — 73700 CT LOWER EXTREMITY W/O DYE: CPT

## 2025-03-25 PROCEDURE — 36415 COLL VENOUS BLD VENIPUNCTURE: CPT

## 2025-03-25 PROCEDURE — 80048 BASIC METABOLIC PNL TOTAL CA: CPT

## 2025-03-25 PROCEDURE — 99284 EMERGENCY DEPT VISIT MOD MDM: CPT

## 2025-03-25 PROCEDURE — 81001 URINALYSIS AUTO W/SCOPE: CPT

## 2025-03-25 PROCEDURE — 74176 CT ABD & PELVIS W/O CONTRAST: CPT

## 2025-03-25 RX ORDER — HYDROCODONE BITARTRATE AND ACETAMINOPHEN 5; 325 MG/1; MG/1
1 TABLET ORAL EVERY 4 HOURS PRN
Qty: 18 TABLET | Refills: 0 | Status: SHIPPED | OUTPATIENT
Start: 2025-03-25 | End: 2025-03-28

## 2025-03-25 ASSESSMENT — PAIN - FUNCTIONAL ASSESSMENT: PAIN_FUNCTIONAL_ASSESSMENT: 0-10

## 2025-03-25 ASSESSMENT — PAIN SCALES - GENERAL
PAINLEVEL_OUTOF10: 8
PAINLEVEL_OUTOF10: 2

## 2025-03-25 ASSESSMENT — LIFESTYLE VARIABLES
HOW MANY STANDARD DRINKS CONTAINING ALCOHOL DO YOU HAVE ON A TYPICAL DAY: PATIENT DOES NOT DRINK
HOW OFTEN DO YOU HAVE A DRINK CONTAINING ALCOHOL: NEVER

## 2025-03-25 NOTE — ED PROVIDER NOTES
[facility] EMERGENCY DEPARTMENT  eMERGENCY dEPARTMENT eNCOUnter   Independent Attestation     Pt Name: Urszula Kauffman  MRN: [id number]  Birthdate 1955  Date of evaluation: 3/25/25    I was personally available for consultation in the Emergency Department. I have reviewed the chart and agree with the documentation as recorded by the MLP, including the assessment, treatment plan and disposition.     Urszula Kauffman is a 69 y.o. female who presents with Fall (Fall 10 days ago. Injured left hip. Pt states still having left hip pain and lower back pain. Pt states pain radiates down left leg. Pt states she already had xray of left hip at [facility]. Pt ambulated in triage with cane. )      Vitals:   Vitals:    03/25/25 1621 03/25/25 1900   BP: (!) 156/77 (!) 140/85   Pulse: 75 68   Resp: 18 16   Temp: 97.9 °F (36.6 °C)    TempSrc: Oral    SpO2: 97% 97%   Weight: 56.7 kg (125 lb)    Height: 1.575 m (5' 2\")        Impression:   1. Closed fracture of sacrum, unspecified portion of sacrum, initial encounter ([facility])    2. Kidney stone          Lety Conteh DO  Attending Emergency  Physician        Lety Conteh DO  03/25/25 9909    
changed to CT abdomen pelvis without contrast [MR]      ED Course User Index  [MR] Karen Cameron APRN - CNP         CRITICAL CARE TIME       CONSULTS:  None    PROCEDURES:  Unless otherwise noted below, none     Procedures        FINAL IMPRESSION      1. Closed fracture of sacrum, unspecified portion of sacrum, initial encounter ([facility])    2. Kidney stone          DISPOSITION/PLAN   DISPOSITION Decision To Discharge 03/25/2025 06:53:20 PM   DISPOSITION CONDITION Stable           PATIENT REFERRED TO:  Jeanette Loya MD  [address] [facility] [address]  [phone]      urologist to discuss the large 8mm kidney stone    [facility]  [address]  [facility] [id number] [phone]    If symptoms worsen    Bruno Asencio MD  [address] [id number] [phone]      to discuss sacral fracture    Sammy Abdul MD  [address]  [address]  [facility] [id number] [phone]      alternate urologist that we spoke to in the ER today to discuss kidney stone      DISCHARGE MEDICATIONS:  Discharge Medication List as of 3/25/2025  7:09 PM        START taking these medications    Details   HYDROcodone-acetaminophen (NORCO) 5-325 MG per tablet Take 1 tablet by mouth every 4 hours as needed for Pain for up to 3 days. Intended supply: 3 days. Take lowest dose possible to manage pain Max Daily Amount: 6 tablets, Disp-18 tablet, R-0Normal           Controlled Substances Monitoring:          No data to display                (Please note that portions of this note were completed with a voice recognition program.  Efforts were made to edit the dictations but occasionally words are mis-transcribed.)    STACEY Ray CNP (electronically signed)             Karen Cameron APRN - CNP  03/25/25 1940

## 2025-03-25 NOTE — DISCHARGE INSTRUCTIONS
Home.  We evaluated you today for left hip pain.  You do have a fracture on the left sacral wing.  This will take about 6 to 8 weeks to heal.  Use your walker.  Norco for severe pain.  Take half a tablet to see how you adjust and use only at bedtime if you feel excessively sleepy from it.  Please know that this medication will be constipating.  Please take MiraLAX or increase your fiber intake to avoid constipation    You have a very large kidney stone.  8 mm in diameter on the left kidney.  This will need close follow-up.  I discussed your care with Dr. Abdul, urology, who is happy to see you in the outpatient setting    If you would choose to see a female, Jeanette Loya is the only female urologist in [facility].  Please call to schedule an appointment as soon as possible and return immediately for any worsening pain, difficulty urinating, blood in the urine, fevers, abdominal pain, or any other concerns.

## 2025-04-29 ENCOUNTER — HOSPITAL ENCOUNTER (EMERGENCY)
Age: 70
Discharge: HOME | End: 2025-04-29
Attending: EMERGENCY MEDICINE
Payer: MEDICARE

## 2025-04-29 ENCOUNTER — APPOINTMENT (OUTPATIENT)
Dept: CT IMAGING | Age: 70
End: 2025-04-29
Payer: MEDICARE

## 2025-04-29 VITALS
HEART RATE: 78 BPM | TEMPERATURE: 97.7 F | WEIGHT: 128 LBS | DIASTOLIC BLOOD PRESSURE: 79 MMHG | RESPIRATION RATE: 18 BRPM | OXYGEN SATURATION: 98 % | HEIGHT: 62 IN | BODY MASS INDEX: 23.55 KG/M2 | SYSTOLIC BLOOD PRESSURE: 140 MMHG

## 2025-04-29 DIAGNOSIS — S32.591A OTHER CLOSED FRACTURE OF RIGHT PUBIS, INITIAL ENCOUNTER (HCC): Primary | ICD-10-CM

## 2025-04-29 DIAGNOSIS — K59.00 CONSTIPATION, UNSPECIFIED CONSTIPATION TYPE: ICD-10-CM

## 2025-04-29 PROCEDURE — 6370000000 HC RX 637 (ALT 250 FOR IP): Performed by: NURSE PRACTITIONER

## 2025-04-29 PROCEDURE — 99284 EMERGENCY DEPT VISIT MOD MDM: CPT

## 2025-04-29 PROCEDURE — 72192 CT PELVIS W/O DYE: CPT

## 2025-04-29 RX ORDER — LIDOCAINE 4 G/G
1 PATCH TOPICAL DAILY
Status: DISCONTINUED | OUTPATIENT
Start: 2025-04-29 | End: 2025-04-29 | Stop reason: HOSPADM

## 2025-04-29 RX ORDER — POLYETHYLENE GLYCOL 3350 17 G/17G
17 POWDER, FOR SOLUTION ORAL DAILY
Qty: 238 G | Refills: 0 | Status: SHIPPED | OUTPATIENT
Start: 2025-04-29 | End: 2025-05-13

## 2025-04-29 RX ORDER — OXYCODONE AND ACETAMINOPHEN 5; 325 MG/1; MG/1
1 TABLET ORAL EVERY 6 HOURS PRN
Qty: 12 TABLET | Refills: 0 | Status: SHIPPED | OUTPATIENT
Start: 2025-04-29 | End: 2025-05-02

## 2025-04-29 RX ORDER — OXYCODONE AND ACETAMINOPHEN 5; 325 MG/1; MG/1
1 TABLET ORAL ONCE
Refills: 0 | Status: DISCONTINUED | OUTPATIENT
Start: 2025-04-29 | End: 2025-04-29 | Stop reason: HOSPADM

## 2025-04-29 ASSESSMENT — PAIN DESCRIPTION - LOCATION: LOCATION: HIP

## 2025-04-29 ASSESSMENT — ENCOUNTER SYMPTOMS
SHORTNESS OF BREATH: 0
ABDOMINAL PAIN: 0
NAUSEA: 0
BACK PAIN: 0
SORE THROAT: 0
DIARRHEA: 0
VOMITING: 0
COUGH: 0

## 2025-04-29 ASSESSMENT — PAIN DESCRIPTION - ORIENTATION: ORIENTATION: RIGHT

## 2025-04-29 ASSESSMENT — PAIN SCALES - GENERAL: PAINLEVEL_OUTOF10: 5

## 2025-04-29 ASSESSMENT — PAIN - FUNCTIONAL ASSESSMENT: PAIN_FUNCTIONAL_ASSESSMENT: 0-10

## 2025-04-29 ASSESSMENT — LIFESTYLE VARIABLES
HOW MANY STANDARD DRINKS CONTAINING ALCOHOL DO YOU HAVE ON A TYPICAL DAY: 1 OR 2
HOW OFTEN DO YOU HAVE A DRINK CONTAINING ALCOHOL: MONTHLY OR LESS

## 2025-04-29 NOTE — ED PROVIDER NOTES
[facility] EMERGENCY DEPARTMENT  EMERGENCY DEPARTMENT ENCOUNTER      Pt Name: Urszula Kauffman  MRN: [id number]  Birthdate 1955  Date of evaluation: 4/29/2025  Provider: STACEY Harrington CNP  4:49 PM    CHIEF COMPLAINT       Chief Complaint   Patient presents with    Hip Pain     Patient presents to ED with complaints of right sided hip pain. Patient states she fractured her sacrum 5 weeks ago and then on Friday she tripped over something and caught herself and started to notice right hip pain. Patient states she started using a cane because it was hard to put pressure on her right leg and now she is using a walker. Patient has been taking Tylenol to help with the pain-last dose 1300.          HISTORY OF PRESENT ILLNESS    Urszula Kauffman is a 69 y.o. female who presents to the emergency department      This is a nontoxic-appearing 69-year-old female presenting via private auto with her , on 3/25/2025 patient had a mechanical trip fall sustaining a left-sided sacral fracture, she states that she had been doing well, she was prescribed Norco pain medication reports that she only takes half a tab at a time, states that she went out with friends this past Thursday, that when she got home she did not have any fall injuries but accidentally kicked something on the ground with her right foot jarring the right hip, the patient's had progressively worsening right hip pain since this, she tried using half a tab of the Norco pain medication with no relief prompting the emergency room visit; the patient does have a history of osteoporosis/osteopenia, reports that she is getting ready to start Prolia injections.  Patient was concern for possible fracture prompting the ER visit.    The history is provided by the patient and medical records.       Nursing Notes were reviewed.    REVIEW OF SYSTEMS       Review of Systems   Constitutional:  Negative for chills, fatigue and fever.   HENT:  Negative for congestion and

## 2025-04-29 NOTE — DISCHARGE INSTRUCTIONS
Tylenol or ibuprofen as directed over-the-counter for mild to moderate pain.    Percocet pain medication as prescribed for severe pain no drinking alcohol or driving while taking this medication it can be habit-forming and cause constipation do not use this with other narcotic medication, if you become constipated please use over-the-counter stool softener with stimulant.    Continue with MiraLAX over-the-counter as directed to help with constipation.    Return to the ER: Fevers, worsening pain, inability to walk, abdominal pain, vomiting, weakness; or any other concerning symptoms.

## 2025-04-29 NOTE — ED PROVIDER NOTES
[facility] Emergency Department    [address]  [facility] [address]  Phone: [phone]  Fax: [phone]  Emergency Department  Faculty Attestation    I performed a history and physical examination of the patient and discussed management with the mid level provider. I reviewed the mid level provider's note and agree with the documented findings and plan of care. Any areas of disagreement are noted on the chart. I was personally present for the key portions of any procedures. I have documented in the chart those procedures where I was not present during the key portions. I agree with the chief complaint, past medical history, past surgical history, allergies, medications, social and family history as documented unless otherwise noted below. For Physician Assistant/ Nurse Practitioner cases/documentation I have personally evaluated this patient and have completed at least one if not all key elements of the E/M (history, physical exam, and MDM). Additional findings are as noted.      Primary Care Physician:  Joo Xiao MD    CHIEF COMPLAINT       Chief Complaint   Patient presents with    Hip Pain     Patient presents to ED with complaints of right sided hip pain. Patient states she fractured her sacrum 5 weeks ago and then on Friday she tripped over something and caught herself and started to notice right hip pain. Patient states she started using a cane because it was hard to put pressure on her right leg and now she is using a walker. Patient has been taking Tylenol to help with the pain-last dose 1300.        RECENT VITALS:   Temp: 97.7 °F (36.5 °C),  Pulse: 78, Respirations: 18, BP: (!) 140/79    LABS:  Labs Reviewed - No data to display     CT PELVIS WO CONTRAST Additional Contrast? None (Final result)  Result time 04/29/25 17:30:23  Final result by Matt Jain MD (04/29/25 17:30:23)                Impression:    1. Probable subacute fracture right pubic bone, not evident

## 2025-05-02 ENCOUNTER — OFFICE VISIT (OUTPATIENT)
Dept: ORTHOPEDIC SURGERY | Age: 70
End: 2025-05-02
Payer: MEDICARE

## 2025-05-02 ENCOUNTER — HOSPITAL ENCOUNTER (EMERGENCY)
Age: 70
Discharge: HOME | End: 2025-05-02
Attending: EMERGENCY MEDICINE
Payer: MEDICARE

## 2025-05-02 ENCOUNTER — TELEPHONE (OUTPATIENT)
Dept: ORTHOPEDIC SURGERY | Age: 70
End: 2025-05-02

## 2025-05-02 VITALS
BODY MASS INDEX: 23.55 KG/M2 | SYSTOLIC BLOOD PRESSURE: 129 MMHG | TEMPERATURE: 98.1 F | OXYGEN SATURATION: 95 % | DIASTOLIC BLOOD PRESSURE: 56 MMHG | HEART RATE: 83 BPM | HEIGHT: 62 IN | RESPIRATION RATE: 16 BRPM | WEIGHT: 128 LBS

## 2025-05-02 VITALS — RESPIRATION RATE: 14 BRPM | WEIGHT: 128 LBS | HEIGHT: 62 IN | BODY MASS INDEX: 23.55 KG/M2

## 2025-05-02 DIAGNOSIS — M25.551 RIGHT HIP PAIN: ICD-10-CM

## 2025-05-02 DIAGNOSIS — M79.18 RIGHT BUTTOCK PAIN: ICD-10-CM

## 2025-05-02 DIAGNOSIS — R79.89 ELEVATED D-DIMER: ICD-10-CM

## 2025-05-02 DIAGNOSIS — M53.3 SACROILIAC JOINT PAIN: Primary | ICD-10-CM

## 2025-05-02 DIAGNOSIS — M79.661 RIGHT CALF PAIN: Primary | ICD-10-CM

## 2025-05-02 LAB — D DIMER PPP FEU-MCNC: 1.12 UG/ML FEU (ref 0–0.59)

## 2025-05-02 PROCEDURE — 99283 EMERGENCY DEPT VISIT LOW MDM: CPT | Performed by: EMERGENCY MEDICINE

## 2025-05-02 PROCEDURE — G8427 DOCREV CUR MEDS BY ELIG CLIN: HCPCS | Performed by: PHYSICIAN ASSISTANT

## 2025-05-02 PROCEDURE — 1090F PRES/ABSN URINE INCON ASSESS: CPT | Performed by: PHYSICIAN ASSISTANT

## 2025-05-02 PROCEDURE — 1159F MED LIST DOCD IN RCRD: CPT | Performed by: PHYSICIAN ASSISTANT

## 2025-05-02 PROCEDURE — G8420 CALC BMI NORM PARAMETERS: HCPCS | Performed by: PHYSICIAN ASSISTANT

## 2025-05-02 PROCEDURE — 99204 OFFICE O/P NEW MOD 45 MIN: CPT | Performed by: PHYSICIAN ASSISTANT

## 2025-05-02 PROCEDURE — G8400 PT W/DXA NO RESULTS DOC: HCPCS | Performed by: PHYSICIAN ASSISTANT

## 2025-05-02 PROCEDURE — 1036F TOBACCO NON-USER: CPT | Performed by: PHYSICIAN ASSISTANT

## 2025-05-02 PROCEDURE — 1123F ACP DISCUSS/DSCN MKR DOCD: CPT | Performed by: PHYSICIAN ASSISTANT

## 2025-05-02 PROCEDURE — 36415 COLL VENOUS BLD VENIPUNCTURE: CPT

## 2025-05-02 PROCEDURE — 6370000000 HC RX 637 (ALT 250 FOR IP)

## 2025-05-02 PROCEDURE — 85379 FIBRIN DEGRADATION QUANT: CPT

## 2025-05-02 PROCEDURE — 3017F COLORECTAL CA SCREEN DOC REV: CPT | Performed by: PHYSICIAN ASSISTANT

## 2025-05-02 PROCEDURE — 1125F AMNT PAIN NOTED PAIN PRSNT: CPT | Performed by: PHYSICIAN ASSISTANT

## 2025-05-02 RX ORDER — METHYLPREDNISOLONE 4 MG/1
TABLET ORAL
Qty: 1 KIT | Refills: 0 | Status: SHIPPED | OUTPATIENT
Start: 2025-05-02 | End: 2025-05-08

## 2025-05-02 RX ADMIN — APIXABAN 10 MG: 5 TABLET, FILM COATED ORAL at 22:15

## 2025-05-02 ASSESSMENT — ENCOUNTER SYMPTOMS
VOMITING: 0
COLOR CHANGE: 0
SHORTNESS OF BREATH: 0
COUGH: 0

## 2025-05-02 ASSESSMENT — PAIN SCALES - GENERAL
PAINLEVEL_OUTOF10: 5
PAINLEVEL_OUTOF10: 9

## 2025-05-02 ASSESSMENT — PAIN DESCRIPTION - ORIENTATION: ORIENTATION: RIGHT

## 2025-05-02 ASSESSMENT — PAIN DESCRIPTION - LOCATION: LOCATION: LEG

## 2025-05-02 ASSESSMENT — PAIN - FUNCTIONAL ASSESSMENT: PAIN_FUNCTIONAL_ASSESSMENT: 0-10

## 2025-05-02 NOTE — PROGRESS NOTES
additional evaluation, no follow-up imaging recommended).  Urinary bladder is decompressed. Visualized portions of bowel are unremarkable with exception of large volume fecal debris evident. Joint: No significant degenerative changes. No osseous erosions.     1. Probable subacute fracture right pubic bone, not evident on CT's 03/25/2025. 2. Subacute left sacral fracture, much better visualized than on prior study. 3. Bones are osteoporotic. 4. Large volume fecal debris in nose portions of the bowel visualized may reflect constipation.          Procedure: None.    ASSESSMENT:     1. Sacroiliac joint pain    2. Right hip pain    3. Right buttock pain           PLAN:  Assessment & Plan  1. Right hip/buttock pain:  Patient is here for right hip and buttock pain with symptoms consistent with right-sided SI joint dysfunction/sacroiliitis.  Patient did have an incident where she stepped her leg on a hard object in her garage floor and has had right lateral hip/right buttock type pain since then.  Patient did go to the emergency room and had a CT scan on 4/29/2025 which revealed no new injury or fracture there was a subacute right pubic bone insufficiency fracture versus bony edema and subacute left sacral wing fractures that was also visualized on prior CT scans from 3/25/2025.    I discussed with the patient that her discomfort is likely muscular in origin and she would benefit from an anti-inflammatory to help with inflammation and pain.    Patient does have physical therapy scheduled to start at a [facility] location on 5/12/2025 for her prior left sacrum injury.  I encouraged her to continue utilizing the walker unless she has significant improvement in her discomfort and continue with activity as tolerated.  If this does not improve over the next 1 week we will order an MRI of the right hip.    Prescription for Medrol Dosepak was sent to the patient's pharmacy.    Patient will follow-up in 1 week for reevaluation.

## 2025-05-02 NOTE — TELEPHONE ENCOUNTER
Ghada with [facility] called in stating patient left message on nurse line requesting return call. Patient had visit today at [facility]. Please call to discuss    Thank you

## 2025-05-03 ASSESSMENT — ENCOUNTER SYMPTOMS
NAUSEA: 0
DIARRHEA: 0
BACK PAIN: 0
VOMITING: 0
ABDOMINAL PAIN: 0
COLOR CHANGE: 0
SHORTNESS OF BREATH: 0

## 2025-05-03 NOTE — ED PROVIDER NOTES
[facility] EMERGENCY DEPARTMENT  eMERGENCY dEPARTMENT eNCOUnter   Independent Attestation     Pt Name: Urszula Kauffman  MRN: [id number]  Birthdate 1955  Date of evaluation: 5/2/25       Urszula Kauffman is a 69 y.o. female who presents with Leg Pain (Pain in rt leg, pt concerned for a blood clot to rt leg)        Based on the medical record, the care appears appropriate. I was personally available for consultation in the Emergency Department.    Farrah Bhakta DO  Attending Emergency  Physician               Farrah Bhakta DO  05/03/25 0027

## 2025-05-03 NOTE — DISCHARGE INSTRUCTIONS
Please return at your scheduled time on Monday for the ultrasound of your right leg to evaluate for a blood clot.     your prescription for Eliquis tomorrow.  This is a blood thinner.  If you have any prolonged bleeding or hit your head please return to the emergency department immediately.    If your venous Doppler is negative for blood clot you can discontinue taking the blood thinner.  If it is positive you will need to return to the ER for a vascular surgery referral.  Otherwise you can follow-up with your orthopedic surgeon regarding your recent injury.    PLEASE RETURN TO THE EMERGENCY DEPARTMENT IMMEDIATELY if your symptoms worsen in anyway or in 8-12 hours if not improved for re-evaluation.  You should immediately return to the ER for symptoms such as increasing pain, bloody stool, fever, a feeling of passing out, light headed, dizziness, chest pain, shortness of breath, persistent nausea and/or vomiting, numbness or weakness to the arms or legs, coolness or color change of the arms or legs.      Take your medication as indicated and prescribed.  If you are given an antibiotic then, make sure you get the prescription filled and take the antibiotics until finished.      THANK YOU!!!    From [facility] and [facility]    On behalf of the Emergency Department staff at [facility], I would like to thank you for giving us the opportunity to address your health care needs and concerns.    We hope that during your visit, our service was delivered in a professional and caring manner. Please keep [facility] in mind as we walk with you down the path to your own personal wellness.     Please expect an automated text message or email from us so we can ask a few questions about your health and progress. Based on your answers, a clinician may call you back to offer help and instructions.    Please understand that early in the process of an illness or injury, an emergency department workup can

## 2025-05-03 NOTE — ED PROVIDER NOTES
[facility] Emergency Department  [address]  [facility] [address]  Phone: [phone]  Fax: [phone]        Pt Name: Urszula Kauffman  MRN: [id number]  Birthdate 1955  Date of evaluation: 5/2/25    CHIEFCOMPLAINT       Chief Complaint   Patient presents with    Leg Pain     Pain in rt leg, pt concerned for a blood clot to rt leg       HISTORY OF PRESENT ILLNESS (Location/Symptom, Timing/Onset, Context/Setting, Quality, Duration, Modifying Factors, Severity)      Urszula Kauffman is a 69 y.o. female with no pertinent PMH who presents to the ED via private auto with c/o right calf pain and right posterior thigh pain worsening over the last few days. Patient ambulating with walker currently d/t healing pelvic fracture.  Patient follow-up with her orthopedic specialist yesterday, was told her pain was likely related to muscle spasms. Patient was prescribed a steroid pack which the patient does not want to take d/t concern for delayed healing of her fracture.  No numbness, tingling, redness or weakness to right lower extremity.  Taking Tylenol over-the-counter for pain.  Has 6 Percocet leftover from previous ER visit on the 29th which she is taking a half of at night only to help with sleep.      PAST MEDICAL / SURGICAL / SOCIAL / FAMILY HISTORY     PMH:  has a past medical history of History of Sjogren's disease.  Surgical History:  has no past surgical history on file.  Social History:  reports that she has never smoked. She has never used smokeless tobacco. She reports current alcohol use.  Family History: has no family status information on file.    family history is not on file.  Psychiatric History: None    Allergies: Patient has no known allergies.    Home Medications:   Prior to Admission medications    Medication Sig Start Date End Date Taking? Authorizing Provider   apixaban (ELIQUIS) 5 MG TABS tablet Take 2 tablets by mouth twice daily for 7 days then take 1 tablet by mouth twice daily       Mouth: Mucous membranes are moist.      Pharynx: Oropharynx is clear.   Eyes:      General:         Right eye: No discharge.         Left eye: No discharge.      Conjunctiva/sclera: Conjunctivae normal.   Cardiovascular:      Rate and Rhythm: Normal rate and regular rhythm.      Pulses: Normal pulses.      Heart sounds: Normal heart sounds. No murmur heard.  Pulmonary:      Effort: Pulmonary effort is normal.   Musculoskeletal:      Cervical back: No rigidity.      Right lower leg: No edema.      Left lower leg: No edema.   Skin:     General: Skin is warm and dry.      Capillary Refill: Capillary refill takes less than 2 seconds.      Coloration: Skin is not jaundiced or pale.      Findings: No rash.   Neurological:      General: No focal deficit present.      Mental Status: She is alert and oriented to person, place, and time.      Cranial Nerves: No cranial nerve deficit.      Sensory: No sensory deficit.      Motor: No weakness.      Gait: Gait abnormal (walker at baseline).           DIFFERENTIAL DIAGNOSIS / MDM     Patient presents emergency department complaint described above.  Patient presents ambulating with a walker, recovering from fracture of the right pubic bone and left sacral fracture, history of osteoporosis.  Patient followed up with her orthopedic specialist, the second of this month due to increased/continued pain.  Patient was prescribed Percocet at the time which she was diagnosed with the right pubic rami fracture on 4/29/2025 in the emergency department and she takes this at night, states she only takes half as this helps with her pain so that she can sleep and takes Tylenol throughout the day.  Patient states she had acute pain to the right calf, also radiating up to her right posterior thigh, very concerned for blood clot.  Patient was evaluated with this concern at her follow-up appointment and was diagnosed with a muscle spasm related to her pelvis injury.  Patient states she was

## 2025-05-05 ENCOUNTER — HOSPITAL ENCOUNTER (OUTPATIENT)
Dept: VASCULAR LAB | Age: 70
Discharge: HOME | End: 2025-05-07
Payer: MEDICARE

## 2025-05-05 DIAGNOSIS — M79.661 RIGHT CALF PAIN: ICD-10-CM

## 2025-05-05 DIAGNOSIS — R79.89 ELEVATED D-DIMER: ICD-10-CM

## 2025-05-05 PROCEDURE — 93971 EXTREMITY STUDY: CPT | Performed by: STUDENT IN AN ORGANIZED HEALTH CARE EDUCATION/TRAINING PROGRAM

## 2025-05-05 PROCEDURE — 93971 EXTREMITY STUDY: CPT

## 2025-05-14 ENCOUNTER — TELEPHONE (OUTPATIENT)
Age: 70
End: 2025-05-14

## 2025-05-14 NOTE — TELEPHONE ENCOUNTER
Patient phoned into call center in regards to wanting to be seen by [facility] Valladares for currently pelvis fracture -- appears that patient has seen Hansa Jimenez, cancelled follow up with note made in there. Dr. Joel reviewed imaging and visit notes, agrees with Hansa Jimenez's care plan - does not think patient needs any further treatment besides physical therapy at this time. Fracture/pain can take quite some time to resolve in these issues -- Dr. Joel would advise patient to continue with Hansa Jimenez's care plan and follow up with her as needed per her care plan.